# Patient Record
Sex: MALE
[De-identification: names, ages, dates, MRNs, and addresses within clinical notes are randomized per-mention and may not be internally consistent; named-entity substitution may affect disease eponyms.]

---

## 2017-02-12 ENCOUNTER — TRANSCRIPTION ENCOUNTER (OUTPATIENT)
Age: 52
End: 2017-02-12

## 2020-05-27 PROBLEM — Z00.00 ENCOUNTER FOR PREVENTIVE HEALTH EXAMINATION: Status: ACTIVE | Noted: 2020-05-27

## 2020-05-28 ENCOUNTER — APPOINTMENT (OUTPATIENT)
Dept: INTERNAL MEDICINE | Facility: CLINIC | Age: 55
End: 2020-05-28
Payer: OTHER MISCELLANEOUS

## 2020-05-28 VITALS
SYSTOLIC BLOOD PRESSURE: 122 MMHG | TEMPERATURE: 98.9 F | DIASTOLIC BLOOD PRESSURE: 76 MMHG | BODY MASS INDEX: 28.25 KG/M2 | RESPIRATION RATE: 18 BRPM | HEIGHT: 67 IN | WEIGHT: 180 LBS | HEART RATE: 54 BPM | OXYGEN SATURATION: 97 %

## 2020-05-28 DIAGNOSIS — Z78.9 OTHER SPECIFIED HEALTH STATUS: ICD-10-CM

## 2020-05-28 DIAGNOSIS — Z82.61 FAMILY HISTORY OF ARTHRITIS: ICD-10-CM

## 2020-05-28 DIAGNOSIS — U07.1 COVID-19: ICD-10-CM

## 2020-05-28 DIAGNOSIS — Z82.49 FAMILY HISTORY OF ISCHEMIC HEART DISEASE AND OTHER DISEASES OF THE CIRCULATORY SYSTEM: ICD-10-CM

## 2020-05-28 DIAGNOSIS — R06.02 SHORTNESS OF BREATH: ICD-10-CM

## 2020-05-28 DIAGNOSIS — Z84.2 FAMILY HISTORY OF OTHER DISEASES OF THE GENITOURINARY SYSTEM: ICD-10-CM

## 2020-05-28 DIAGNOSIS — Z82.62 FAMILY HISTORY OF OSTEOPOROSIS: ICD-10-CM

## 2020-05-28 PROCEDURE — 99204 OFFICE O/P NEW MOD 45 MIN: CPT

## 2020-05-28 NOTE — PHYSICAL EXAM
[No Acute Distress] : no acute distress [Normal Oropharynx] : normal oropharynx [Normal Appearance] : normal appearance [No Neck Mass] : no neck mass [Normal S1, S2] : normal s1, s2 [Normal Rate/Rhythm] : normal rate/rhythm [No Murmurs] : no murmurs [Clear to Auscultation Bilaterally] : clear to auscultation bilaterally [No Resp Distress] : no resp distress [No Abnormalities] : no abnormalities [Benign] : benign [Normal Gait] : normal gait [No Clubbing] : no clubbing [No Cyanosis] : no cyanosis [No Edema] : no edema [FROM] : FROM [Normal Color/ Pigmentation] : normal color/ pigmentation [No Focal Deficits] : no focal deficits [Oriented x3] : oriented x3 [Normal Affect] : normal affect

## 2020-05-28 NOTE — HISTORY OF PRESENT ILLNESS
[TextBox_4] : Mr. White is a 54-year-old male presents for initial pulmonary evaluation complaining of intermittent episodes of shortness of breath. Mr. Lyn states that he was diagnosed with rotavirus in the first week of April. At that time he was having persistent fevers. He was treated with Zithromax for hydroxychloroquine. After 10 days to 2 weeks his fever improved, however, patient continues to have some symptoms of dyspnea with exertion. He has no nocturnal symptoms of cough or shortness of breath. Mr. White has no previous history of asthma or seasonal allergic rhinitis. He is a lifelong nonsmoker.

## 2020-05-28 NOTE — DISCUSSION/SUMMARY
[FreeTextEntry1] : Mr. White presents for initial pulmonary evaluation. He has a history for rotavirus exposure. He is having some mild shortness of breath with exertion. Patient did have a chest x-ray approximately one month ago which may have had an "shadow". He was treated with antibiotics. Mr. Lyn will be sent for repeat chest x-ray. His O2 saturation on room air is 97%. He will follow up in this office on an as needed basis.

## 2020-08-08 ENCOUNTER — TRANSCRIPTION ENCOUNTER (OUTPATIENT)
Age: 55
End: 2020-08-08

## 2025-01-27 ENCOUNTER — OFFICE (OUTPATIENT)
Facility: LOCATION | Age: 60
Setting detail: OPHTHALMOLOGY
End: 2025-01-27
Payer: COMMERCIAL

## 2025-01-27 DIAGNOSIS — H25.13: ICD-10-CM

## 2025-01-27 DIAGNOSIS — H16.423: ICD-10-CM

## 2025-01-27 DIAGNOSIS — H43.812: ICD-10-CM

## 2025-01-27 DIAGNOSIS — H43.393: ICD-10-CM

## 2025-01-27 PROCEDURE — 92004 COMPRE OPH EXAM NEW PT 1/>: CPT | Performed by: OPHTHALMOLOGY

## 2025-01-27 ASSESSMENT — SUPERFICIAL PUNCTATE KERATITIS (SPK)
OS_SPK: T
OD_SPK: T

## 2025-01-27 ASSESSMENT — CONFRONTATIONAL VISUAL FIELD TEST (CVF)
OS_FINDINGS: FULL
OD_FINDINGS: FULL

## 2025-01-27 ASSESSMENT — TONOMETRY
OD_IOP_MMHG: 16
OS_IOP_MMHG: 16

## 2025-01-27 ASSESSMENT — VASCULARIZATION
OS_VASCULARIZATION: PANNUS
OD_VASCULARIZATION: PANNUS

## 2025-01-28 ENCOUNTER — OFFICE (OUTPATIENT)
Dept: URBAN - METROPOLITAN AREA CLINIC 88 | Facility: CLINIC | Age: 60
Setting detail: OPHTHALMOLOGY
End: 2025-01-28
Payer: COMMERCIAL

## 2025-01-28 DIAGNOSIS — H35.372: ICD-10-CM

## 2025-01-28 DIAGNOSIS — H43.812: ICD-10-CM

## 2025-01-28 DIAGNOSIS — H43.393: ICD-10-CM

## 2025-01-28 DIAGNOSIS — H35.433: ICD-10-CM

## 2025-01-28 DIAGNOSIS — H25.13: ICD-10-CM

## 2025-01-28 DIAGNOSIS — H35.40: ICD-10-CM

## 2025-01-28 PROCEDURE — 92014 COMPRE OPH EXAM EST PT 1/>: CPT | Performed by: OPHTHALMOLOGY

## 2025-01-28 PROCEDURE — 92134 CPTRZ OPH DX IMG PST SGM RTA: CPT | Performed by: OPHTHALMOLOGY

## 2025-01-28 ASSESSMENT — CONFRONTATIONAL VISUAL FIELD TEST (CVF)
OD_FINDINGS: FULL
OS_FINDINGS: FULL

## 2025-01-28 ASSESSMENT — VISUAL ACUITY
OD_BCVA: 20/40
OS_BCVA: 20/40

## 2025-01-28 ASSESSMENT — SUPERFICIAL PUNCTATE KERATITIS (SPK)
OS_SPK: T
OD_SPK: T

## 2025-01-28 ASSESSMENT — VASCULARIZATION
OD_VASCULARIZATION: PANNUS
OS_VASCULARIZATION: PANNUS

## 2025-01-28 ASSESSMENT — TONOMETRY
OD_IOP_MMHG: 16
OS_IOP_MMHG: 15

## 2025-01-29 ENCOUNTER — RX ONLY (RX ONLY)
Age: 60
End: 2025-01-29

## 2025-01-29 PROBLEM — H31.29 PERIPAPILLARY ATROPHY ; BOTH EYES: Status: ACTIVE | Noted: 2025-01-27

## 2025-01-29 PROBLEM — H43.812 VITREOUS DETACHMENT; LEFT EYE: Status: ACTIVE | Noted: 2025-01-27

## 2025-01-29 PROBLEM — H35.372 ERM; LEFT EYE: Status: ACTIVE | Noted: 2025-01-27

## 2025-01-29 PROBLEM — H16.223 DRY EYE SYNDROME K SICCA; BOTH EYES: Status: ACTIVE | Noted: 2025-01-27

## 2025-01-29 PROBLEM — H25.13 CATARACT SENILE NUCLEAR SCLEROSIS; BOTH EYES: Status: ACTIVE | Noted: 2025-01-27

## 2025-01-29 PROBLEM — H43.393 VITREOUS FLOATERS; BOTH EYES: Status: ACTIVE | Noted: 2025-01-27

## 2025-01-29 PROBLEM — H16.423 CORNEAL PANNUS; BOTH EYES: Status: ACTIVE | Noted: 2025-01-27

## 2025-01-29 ASSESSMENT — VISUAL ACUITY
OS_BCVA: 20/30
OD_BCVA: 20/50-2

## 2025-03-03 ENCOUNTER — OFFICE (OUTPATIENT)
Dept: URBAN - METROPOLITAN AREA CLINIC 88 | Facility: CLINIC | Age: 60
Setting detail: OPHTHALMOLOGY
End: 2025-03-03
Payer: COMMERCIAL

## 2025-03-03 DIAGNOSIS — H35.372: ICD-10-CM

## 2025-03-03 DIAGNOSIS — H43.393: ICD-10-CM

## 2025-03-03 DIAGNOSIS — H43.812: ICD-10-CM

## 2025-03-03 DIAGNOSIS — H35.433: ICD-10-CM

## 2025-03-03 PROBLEM — H35.40 PERIPAPILLARY ATROPHY ; BOTH EYES: Status: ACTIVE | Noted: 2025-03-03

## 2025-03-03 PROCEDURE — 92134 CPTRZ OPH DX IMG PST SGM RTA: CPT | Performed by: OPHTHALMOLOGY

## 2025-03-03 PROCEDURE — 99213 OFFICE O/P EST LOW 20 MIN: CPT | Performed by: OPHTHALMOLOGY

## 2025-03-03 ASSESSMENT — VASCULARIZATION
OD_VASCULARIZATION: PANNUS
OS_VASCULARIZATION: PANNUS

## 2025-03-03 ASSESSMENT — SUPERFICIAL PUNCTATE KERATITIS (SPK)
OS_SPK: T
OD_SPK: T

## 2025-03-03 ASSESSMENT — TONOMETRY
OD_IOP_MMHG: 11
OS_IOP_MMHG: 16

## 2025-03-03 ASSESSMENT — VISUAL ACUITY
OS_BCVA: 20/40
OD_BCVA: 20/50+2

## 2025-03-03 ASSESSMENT — CONFRONTATIONAL VISUAL FIELD TEST (CVF)
OD_FINDINGS: FULL
OS_FINDINGS: FULL